# Patient Record
Sex: FEMALE | Race: WHITE
[De-identification: names, ages, dates, MRNs, and addresses within clinical notes are randomized per-mention and may not be internally consistent; named-entity substitution may affect disease eponyms.]

---

## 2018-01-14 ENCOUNTER — HOSPITAL ENCOUNTER (EMERGENCY)
Dept: HOSPITAL 17 - PHEFT | Age: 23
Discharge: HOME | End: 2018-01-14
Payer: SELF-PAY

## 2018-01-14 VITALS — HEIGHT: 69 IN | BODY MASS INDEX: 40.49 KG/M2 | WEIGHT: 273.37 LBS

## 2018-01-14 VITALS
HEART RATE: 91 BPM | RESPIRATION RATE: 16 BRPM | SYSTOLIC BLOOD PRESSURE: 174 MMHG | OXYGEN SATURATION: 99 % | DIASTOLIC BLOOD PRESSURE: 78 MMHG | TEMPERATURE: 98.4 F

## 2018-01-14 DIAGNOSIS — M62.830: Primary | ICD-10-CM

## 2018-01-14 PROCEDURE — 99284 EMERGENCY DEPT VISIT MOD MDM: CPT

## 2018-01-14 PROCEDURE — 96372 THER/PROPH/DIAG INJ SC/IM: CPT

## 2018-01-14 NOTE — PD
HPI


Chief Complaint:  Musculoskeletal Complaint


Time Seen by Provider:  11:28


Travel History


International Travel<30 days:  No


Contact w/Intl Traveler<30days:  No


Traveled to known affect area:  No





History of Present Illness


HPI


22-year-old female presents to the ED for evaluation of 10/10 left back pain.  

Sudden onset this morning when she was making the bowl of cereal.  Described as 

cramping, worsened by certain movements.  Patient states that she's had similar 

pains usually last for a few seconds but then resolved spontaneously.  She 

denies radiation of the pain, numbness, tingling, weakness, limitations to 

range of motion of the extremities, saddle anesthesia, urinary or fecal 

incontinence.  She denies dysuria, hematuria, urinary urgency.  No known 

injury.  No treatment at home.





PFSH


Past Medical History


Developmental Delay:  No


Diminished Hearing:  No


Neurologic:  Yes (pseudo Cerebri tumor)


Immunizations Current:  Yes


Influenza Vaccination:  No


Pregnant?:  Not Pregnant





Past Surgical History


Surgical History:  No Previous Surgery





Social History


Alcohol Use:  No


Tobacco Use:  No


Substance Use:  No





Allergies-Medications


(Allergen,Severity, Reaction):  


Coded Allergies:  


     No Known Allergies (Unverified  Adverse Reaction, Unknown, 1/14/18)


Reported Meds & Prescriptions





Reported Meds & Active Scripts


Active


No Active Prescriptions or Reported Medications    








Review of Systems


Except as stated in HPI:  all other systems reviewed are Neg





Physical Exam


Narrative


GENERAL: Obese white female in no acute distress.  


SKIN: Focused skin assessment warm/dry.


HEAD: Normocephalic.


EYES: No scleral icterus. No injection or drainage. 


NECK: Supple, trachea midline. No JVD or lymphadenopathy.


CARDIOVASCULAR: Regular rate and rhythm without murmurs, gallops, or rubs. 


RESPIRATORY: Breath sounds equal bilaterally. No accessory muscle use.


GASTROINTESTINAL: Abdomen soft, non-tender, nondistended. 


MUSCULOSKELETAL: No cyanosis, or edema.  5/5 strength of dorsiflexion, plantar 

flexion, hip and knee flexion bilaterally.  Straight leg raise negative 

bilaterally.


BACK: No obvious deformity.  No midline tenderness.  No CVA tenderness.  

Tenderness and palpable spasm of the paraspinal musculature in the mid thoracic 

spine.





Data


Data


Last Documented VS





Vital Signs








  Date Time  Temp Pulse Resp B/P (MAP) Pulse Ox O2 Delivery O2 Flow Rate FiO2


 


1/14/18 11:19 98.4 91 16 174/78 (110) 99   








Orders





 Orders


Ketorolac Inj (Toradol Inj) (1/14/18 11:45)


Orphenadrine Inj (Norflex Inj) (1/14/18 11:45)


Ondansetron  Odt (Zofran  Odt) (1/14/18 11:45)








MDM


Medical Decision Making


Medical Screen Exam Complete:  Yes


Emergency Medical Condition:  Yes


Differential Diagnosis


Acute on chronic back pain versus musculoskeletal pain versus muscle spasm 

versus other


Narrative Course


22-year-old female presents to the ED for evaluation of sudden onset, cramping 

10/10 left back pain.  She denies radiation of the pain, numbness, tingling, 

weakness, limitations to range of motion of the extremities, saddle anesthesia, 

urinary or fecal incontinence, dysuria, hematuria, urinary urgency.  Vitals 

reviewed.  On exam the patient has tenderness to palpation and palpable spasm 

of the paraspinal musculature in the left mid thoracic spine.  5/5 strength in 

the lower extremities.  No CVA tenderness.  This is muscle spasm.  Patient was 

administered 4 mg Zofran ODT, IM Toradol and Norflex.  She is prescribed a 

short course of anti-inflammatories and muscle relaxants.  She is instructed to 

return to normal, gentle activities as tolerated, follow up with a primary care 

provider.  We discussed reasons to return to the ED.  She indicated 

understanding of instructions and is agreeable to the care plan.  The patient 

is stable and discharged home.





Diagnosis





 Primary Impression:  


 Muscle spasm of back


Referrals:  


Primary Care Physician


Patient Instructions:  Back Pain (ED), General Instructions, Muscle Spasm (ED)





***Additional Instructions:  


Hydrate.


A mixture of rest and and normal, gentle activity is best for back pain.


Gentle massage of the area may also help to improve your pain.  


Warm or cold compresses applied to the area 10-15 minutes a few times a day may 

also help to improve your pain symptoms.


Do not apply ice for longer than 10-15 minutes per session.


Do not begin ibuprofen until tomorrow.


You may begin muscle relaxants 6 hours after discharge.


Do not drive while taking muscle relaxants.


Follow-up with the primary care provider.


Return to the ED for worsening symptoms or any urgent or emergent medical 

condition.


***Med/Other Pt SpecificInfo:  Prescription(s) given


Scripts


Cyclobenzaprine (Flexeril) 10 Mg Tab


10 MG PO TID for Muscle Spasm, #12 TAB 0 Refills


   Prov: Marianne Rodriguez MD         1/14/18 


Ibuprofen (Ibuprofen) 600 Mg Tab


600 MG PO Q8HR Y for PAIN, #15 TAB 0 Refills


   Prov: Marianne Rodriguez MD         1/14/18


Disposition:  01 DISCHARGE HOME


Condition:  Stable











Estephanie Crawford Jan 14, 2018 11:40